# Patient Record
Sex: MALE | Race: BLACK OR AFRICAN AMERICAN
[De-identification: names, ages, dates, MRNs, and addresses within clinical notes are randomized per-mention and may not be internally consistent; named-entity substitution may affect disease eponyms.]

---

## 2022-07-25 ENCOUNTER — HOSPITAL ENCOUNTER (EMERGENCY)
Dept: HOSPITAL 12 - ER | Age: 37
Discharge: HOME | End: 2022-07-25
Payer: MEDICAID

## 2022-07-25 VITALS — DIASTOLIC BLOOD PRESSURE: 47 MMHG | SYSTOLIC BLOOD PRESSURE: 123 MMHG

## 2022-07-25 VITALS — HEIGHT: 67 IN | BODY MASS INDEX: 23.7 KG/M2 | WEIGHT: 151 LBS

## 2022-07-25 DIAGNOSIS — F19.10: ICD-10-CM

## 2022-07-25 DIAGNOSIS — K22.6: ICD-10-CM
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
## 2022-07-25 DIAGNOSIS — Z59.00: ICD-10-CM

## 2022-07-25 DIAGNOSIS — F10.10: Primary | ICD-10-CM

## 2022-07-25 LAB
ALP SERPL-CCNC: 71 U/L (ref 50–136)
ALT SERPL W/O P-5'-P-CCNC: 28 U/L (ref 16–63)
AST SERPL-CCNC: 23 U/L (ref 15–37)
BILIRUB DIRECT SERPL-MCNC: 0.2 MG/DL (ref 0–0.2)
BILIRUB SERPL-MCNC: 0.7 MG/DL (ref 0.2–1)
BUN SERPL-MCNC: 12 MG/DL (ref 7–18)
CHLORIDE SERPL-SCNC: 103 MMOL/L (ref 98–107)
CO2 SERPL-SCNC: 33 MMOL/L (ref 21–32)
CREAT SERPL-MCNC: 0.9 MG/DL (ref 0.6–1.3)
ETHANOL SERPL-MCNC: < 3 MG/DL (ref 0–0)
GLUCOSE SERPL-MCNC: 70 MG/DL (ref 74–106)
HCT VFR BLD AUTO: 42.9 % (ref 36.7–47.1)
LIPASE SERPL-CCNC: 139 U/L (ref 73–393)
MAGNESIUM SERPL-MCNC: 1.9 MG/DL (ref 1.8–2.4)
MCH RBC QN AUTO: 32.5 UUG (ref 23.8–33.4)
MCV RBC AUTO: 91.6 FL (ref 73–96.2)
PLATELET # BLD AUTO: 325 K/UL (ref 152–348)
POTASSIUM SERPL-SCNC: 3.7 MMOL/L (ref 3.5–5.1)
WS STN SPEC: 7.4 G/DL (ref 6.4–8.2)

## 2022-07-25 PROCEDURE — G0480 DRUG TEST DEF 1-7 CLASSES: HCPCS

## 2022-07-25 PROCEDURE — A4663 DIALYSIS BLOOD PRESSURE CUFF: HCPCS

## 2022-07-25 SDOH — ECONOMIC STABILITY - HOUSING INSECURITY: HOMELESSNESS UNSPECIFIED: Z59.00

## 2022-07-27 ENCOUNTER — HOSPITAL ENCOUNTER (EMERGENCY)
Dept: HOSPITAL 12 - ER | Age: 37
LOS: 1 days | Discharge: HOME | End: 2022-07-28
Payer: COMMERCIAL

## 2022-07-27 VITALS — BODY MASS INDEX: 23.49 KG/M2 | HEIGHT: 68 IN | WEIGHT: 155 LBS

## 2022-07-27 DIAGNOSIS — F10.20: ICD-10-CM

## 2022-07-27 DIAGNOSIS — F15.10: Primary | ICD-10-CM

## 2022-07-27 DIAGNOSIS — Z59.00: ICD-10-CM

## 2022-07-27 PROCEDURE — A4663 DIALYSIS BLOOD PRESSURE CUFF: HCPCS

## 2022-07-27 PROCEDURE — G0480 DRUG TEST DEF 1-7 CLASSES: HCPCS

## 2022-07-27 SDOH — ECONOMIC STABILITY - HOUSING INSECURITY: HOMELESSNESS UNSPECIFIED: Z59.00

## 2022-07-28 VITALS — SYSTOLIC BLOOD PRESSURE: 126 MMHG | DIASTOLIC BLOOD PRESSURE: 93 MMHG

## 2022-07-28 LAB
ALP SERPL-CCNC: 66 U/L (ref 50–136)
ALT SERPL W/O P-5'-P-CCNC: 29 U/L (ref 16–63)
AMPHETAMINES UR QL SCN>1000 NG/ML: POSITIVE
APAP SERPL-MCNC: < 2 UG/ML (ref 10–30)
AST SERPL-CCNC: 30 U/L (ref 15–37)
BILIRUB DIRECT SERPL-MCNC: 0.3 MG/DL (ref 0–0.2)
BILIRUB SERPL-MCNC: 0.7 MG/DL (ref 0.2–1)
BUN SERPL-MCNC: 10 MG/DL (ref 7–18)
CHLORIDE SERPL-SCNC: 102 MMOL/L (ref 98–107)
CO2 SERPL-SCNC: 33 MMOL/L (ref 21–32)
COCAINE UR QL SCN: NEGATIVE
CREAT SERPL-MCNC: 0.8 MG/DL (ref 0.6–1.3)
ETHANOL SERPL-MCNC: < 3 MG/DL (ref 0–0)
GLUCOSE SERPL-MCNC: 93 MG/DL (ref 74–106)
HCT VFR BLD AUTO: 43.9 % (ref 36.7–47.1)
MCH RBC QN AUTO: 33.1 UUG (ref 23.8–33.4)
MCV RBC AUTO: 91.9 FL (ref 73–96.2)
OPIATES UR QL SCN: NEGATIVE
PCP UR QL SCN>25 NG/ML: NEGATIVE
PLATELET # BLD AUTO: 339 K/UL (ref 152–348)
POTASSIUM SERPL-SCNC: 4 MMOL/L (ref 3.5–5.1)
THC UR QL SCN>50 NG/ML: POSITIVE
WS STN SPEC: 7.6 G/DL (ref 6.4–8.2)

## 2022-07-28 NOTE — NUR
Social work consult was requested for a homeless patient in the emergency room for 
resources. Patient is 37-year-old male admitted to the hospital for alcohol abuse. Patient 
is alert and oriented X4. Patient presents with anxious mood and congruent affect. Patient 
presents with poor judgement and insight. Patient states his primary contact is his mother, 
Afsaneh ([phone]) and she is currently living in [place], [place]. Patient stated he has 
been homeless since April and has been living in [place]. DEEP gave the patient resources for 
[facility] [place] [address] [place] [address] ([phone]) and 
[facility] [address] [facility] [address] 
([phone]). DEEP gave resources for [facility] [address] [facility] [address].  Resources were placed in the chart. Homeless waiver was signed and given to 
the patient and a copy was placed in the chart. Patient states that he is currently working 
for [employer]. 

 

Patient states that he has a history of alcohol abuse. The toxicology report is positive for 
amphetamine and cannabinoids. DEEP gave the patient referrals and resources for substance use 
from [facility] [address] [facility] [address] ([phone]), [facility] 
[address] [facility] [address] ([phone]), and [facility] [address] [place] [address] ([phone]). Resources were placed in the chart. Patient 
states he has a history of depression. Patient states he has a history of passive suicidal 
ideation. Patient denies homicidal ideation. DEEP provided resources for [facility] 
Mental [facility] [address] [place] [address] ([phone]) and were placed in the 
cart. DEEP provided emotional support and validation when speaking with the patient. Patient 
states he will discharge to a local shelter. DEEP provided the patient a TAP card for 
discharge

## 2022-08-01 ENCOUNTER — HOSPITAL ENCOUNTER (EMERGENCY)
Dept: HOSPITAL 12 - ER | Age: 37
LOS: 1 days | Discharge: HOME | End: 2022-08-02
Payer: MEDICAID

## 2022-08-01 VITALS — BODY MASS INDEX: 29.16 KG/M2 | WEIGHT: 175 LBS | HEIGHT: 65 IN

## 2022-08-01 DIAGNOSIS — F10.10: ICD-10-CM

## 2022-08-01 DIAGNOSIS — Z20.822: ICD-10-CM
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
## 2022-08-01 DIAGNOSIS — F12.10: ICD-10-CM

## 2022-08-01 DIAGNOSIS — R45.850: Primary | ICD-10-CM

## 2022-08-01 DIAGNOSIS — Z59.00: ICD-10-CM

## 2022-08-01 PROCEDURE — G0480 DRUG TEST DEF 1-7 CLASSES: HCPCS

## 2022-08-01 PROCEDURE — A4663 DIALYSIS BLOOD PRESSURE CUFF: HCPCS

## 2022-08-01 SDOH — ECONOMIC STABILITY - HOUSING INSECURITY: HOMELESSNESS UNSPECIFIED: Z59.00

## 2022-08-02 LAB
ALP SERPL-CCNC: 68 U/L (ref 50–136)
ALT SERPL W/O P-5'-P-CCNC: 36 U/L (ref 16–63)
AMPHETAMINES UR QL SCN>1000 NG/ML: NEGATIVE
APAP SERPL-MCNC: < 2 UG/ML (ref 10–30)
AST SERPL-CCNC: 64 U/L (ref 15–37)
BILIRUB DIRECT SERPL-MCNC: 0.4 MG/DL (ref 0–0.2)
BILIRUB SERPL-MCNC: 1 MG/DL (ref 0.2–1)
BUN SERPL-MCNC: 11 MG/DL (ref 7–18)
CHLORIDE SERPL-SCNC: 103 MMOL/L (ref 98–107)
CO2 SERPL-SCNC: 30 MMOL/L (ref 21–32)
COCAINE UR QL SCN: NEGATIVE
CREAT SERPL-MCNC: 0.8 MG/DL (ref 0.6–1.3)
ETHANOL SERPL-MCNC: < 3 MG/DL (ref 0–0)
GLUCOSE SERPL-MCNC: 92 MG/DL (ref 74–106)
HCT VFR BLD AUTO: 41.2 % (ref 36.7–47.1)
MCH RBC QN AUTO: 32.5 UUG (ref 23.8–33.4)
MCV RBC AUTO: 91.2 FL (ref 73–96.2)
OPIATES UR QL SCN: NEGATIVE
PCP UR QL SCN>25 NG/ML: NEGATIVE
PLATELET # BLD AUTO: 300 K/UL (ref 152–348)
POTASSIUM SERPL-SCNC: 3.9 MMOL/L (ref 3.5–5.1)
THC UR QL SCN>50 NG/ML: POSITIVE
WS STN SPEC: 7.1 G/DL (ref 6.4–8.2)

## 2022-08-02 NOTE — NUR
Social work consult was requested for a homeless patient in the emergency room for 
resources. Patient is 37-year-old black male. Patient is alert and oriented X4. Patient 
presents with euthymic mood and full range of affect. Patient presents with poor judgement 
and insight. Patient states his primary contact is his mother, Afsaneh ([phone]) and she 
is currently living in [place]. Patient stated he has been homeless since April and 
has been living in [place]. DEEP provided the patient resources and gave him the information 
for [facility] [place] [address] [place] [address] ([phone]) 
and [facility] [address] [facility] [address] 
([phone]). DEEP gave resources for [employer] [facility] [address] [facility] [address].  Resources were placed in the chart. Homeless waiver was signed and given to 
the patient and a copy was placed in the chart. Patient states that he is currently working 
for [employer]. DEEP provided the patient employment resources for [employer] [facility] [address] [facility], [address], [employer] 
[address] [facility], [address] and [facility] [address] [facility] 
[address], [place], [address]. 



Patient states that he has a history of alcohol abuse. DEEP provided the patient resources and 
referrals for substance use from [facility] [address] [facility] [address] 
([phone]), [facility] [address] [facility] [address] ([phone]), and 
[facility] [address] [facility] [address] ([phone]). The toxicology screen is 
positive for cannabinoids. Resources were placed in the chart. Patient states he has a 
history of depression. Patient denies current suicidal or homicidal ideation. DEEP provided 
resources for [facility] [address] [place] [address] 
([phone]) and were placed in the chart. SW provided emotional support and validation 
when speaking with the patient. Patient states he was going to call [address] [address] ([phone]). Patient states he will discharge to a 
local shelter or treatment center. SW provided the patient a TAP card for discharge.

## 2022-08-09 ENCOUNTER — HOSPITAL ENCOUNTER (EMERGENCY)
Dept: HOSPITAL 12 - ER | Age: 37
LOS: 1 days | Discharge: PSYCHIATRIC OR SUBSTANCE USE FACILITY | End: 2022-08-10
Payer: COMMERCIAL

## 2022-08-09 VITALS — BODY MASS INDEX: 24.33 KG/M2 | WEIGHT: 155 LBS | HEIGHT: 67 IN

## 2022-08-09 DIAGNOSIS — F20.9: ICD-10-CM

## 2022-08-09 DIAGNOSIS — Z20.822: ICD-10-CM

## 2022-08-09 DIAGNOSIS — R45.851: Primary | ICD-10-CM

## 2022-08-09 DIAGNOSIS — F19.10: ICD-10-CM
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
## 2022-08-09 DIAGNOSIS — F10.10: ICD-10-CM

## 2022-08-09 LAB
ALP SERPL-CCNC: 66 U/L (ref 50–136)
ALT SERPL W/O P-5'-P-CCNC: 36 U/L (ref 16–63)
AMPHETAMINES UR QL SCN>1000 NG/ML: NEGATIVE
APAP SERPL-MCNC: < 2 UG/ML (ref 10–30)
APPEARANCE UR: CLEAR
AST SERPL-CCNC: 37 U/L (ref 15–37)
BILIRUB DIRECT SERPL-MCNC: 0.2 MG/DL (ref 0–0.2)
BILIRUB SERPL-MCNC: 0.5 MG/DL (ref 0.2–1)
BILIRUB UR QL STRIP: NEGATIVE
BUN SERPL-MCNC: 13 MG/DL (ref 7–18)
CHLORIDE SERPL-SCNC: 96 MMOL/L (ref 98–107)
CO2 SERPL-SCNC: 29 MMOL/L (ref 21–32)
COCAINE UR QL SCN: NEGATIVE
COLOR UR: YELLOW
CREAT SERPL-MCNC: 0.9 MG/DL (ref 0.6–1.3)
ETHANOL SERPL-MCNC: 4 MG/DL (ref 0–0)
GLUCOSE SERPL-MCNC: 112 MG/DL (ref 74–106)
GLUCOSE UR STRIP-MCNC: NEGATIVE MG/DL
HCT VFR BLD AUTO: 42.1 % (ref 36.7–47.1)
HGB UR QL STRIP: NEGATIVE
KETONES UR STRIP-MCNC: NEGATIVE MG/DL
LEUKOCYTE ESTERASE UR QL STRIP: NEGATIVE
MCH RBC QN AUTO: 33.2 UUG (ref 23.8–33.4)
MCV RBC AUTO: 92.4 FL (ref 73–96.2)
NITRITE UR QL STRIP: NEGATIVE
OPIATES UR QL SCN: NEGATIVE
PCP UR QL SCN>25 NG/ML: NEGATIVE
PH UR STRIP: 6.5 [PH] (ref 5–8)
PLATELET # BLD AUTO: 269 K/UL (ref 152–348)
POTASSIUM SERPL-SCNC: 3.9 MMOL/L (ref 3.5–5.1)
SP GR UR STRIP: 1.02 (ref 1–1.03)
THC UR QL SCN>50 NG/ML: POSITIVE
UROBILINOGEN UR STRIP-MCNC: 0.2 E.U./DL
WS STN SPEC: 7.8 G/DL (ref 6.4–8.2)

## 2022-08-09 PROCEDURE — G0480 DRUG TEST DEF 1-7 CLASSES: HCPCS

## 2022-08-09 PROCEDURE — A4663 DIALYSIS BLOOD PRESSURE CUFF: HCPCS

## 2022-08-09 NOTE — NUR
Faxed facesheet and clinicals to [facility] for request for voluntary 
admission. Faxe #[phone]. Phone # [phone].

## 2022-08-09 NOTE — NUR
Pt changed into patient gown.  Belongings gathered and labeled.



Pt denies auditory and visual hallucinations, endorses possible SI (no plan) 
and HI (nothing specific).

## 2022-08-10 NOTE — NUR
Called report to JENIFFER Frias.



Transport ETA:  [phone] via [facility].  Altagracia adrian.

## 2022-08-10 NOTE — NUR
Kim from [facility] intake called back with transfer info. Patient will be going 
to [facility]. Accepting MD is Dr Moreau, call for report number is 
[phone]. Kim states report will be accepted at 0900 today and can send 
patient at noon today to [facility].

## 2022-08-10 NOTE — NUR
Gave EMTs pt chart and report.  Gave crew pt belongings.  Pt transported to 
[facility].

## 2022-09-29 ENCOUNTER — HOSPITAL ENCOUNTER (EMERGENCY)
Dept: HOSPITAL 12 - ER | Age: 37
LOS: 1 days | Discharge: HOME | End: 2022-09-30
Payer: MEDICAID

## 2022-09-29 VITALS — WEIGHT: 150 LBS | BODY MASS INDEX: 23.54 KG/M2 | HEIGHT: 67 IN

## 2022-09-29 DIAGNOSIS — F19.10: ICD-10-CM

## 2022-09-29 DIAGNOSIS — F32.A: ICD-10-CM

## 2022-09-29 DIAGNOSIS — F41.9: ICD-10-CM

## 2022-09-29 DIAGNOSIS — F10.10: Primary | ICD-10-CM

## 2022-09-29 DIAGNOSIS — F20.9: ICD-10-CM

## 2022-09-29 PROCEDURE — A4663 DIALYSIS BLOOD PRESSURE CUFF: HCPCS

## 2022-09-30 ENCOUNTER — HOSPITAL ENCOUNTER (EMERGENCY)
Dept: HOSPITAL 12 - ER | Age: 37
LOS: 1 days | Discharge: HOME | End: 2022-10-01
Payer: MEDICAID

## 2022-09-30 VITALS — DIASTOLIC BLOOD PRESSURE: 72 MMHG | SYSTOLIC BLOOD PRESSURE: 119 MMHG

## 2022-09-30 VITALS — WEIGHT: 150 LBS | HEIGHT: 67 IN | BODY MASS INDEX: 23.54 KG/M2

## 2022-09-30 DIAGNOSIS — F12.10: ICD-10-CM

## 2022-09-30 DIAGNOSIS — Z00.00: Primary | ICD-10-CM

## 2022-09-30 DIAGNOSIS — Z59.00: ICD-10-CM

## 2022-09-30 DIAGNOSIS — F10.10: ICD-10-CM

## 2022-09-30 DIAGNOSIS — F20.9: ICD-10-CM

## 2022-09-30 PROCEDURE — A4663 DIALYSIS BLOOD PRESSURE CUFF: HCPCS

## 2022-09-30 SDOH — ECONOMIC STABILITY - HOUSING INSECURITY: HOMELESSNESS UNSPECIFIED: Z59.00

## 2022-09-30 NOTE — NUR
PATIENT STATES HE "WANTS HELP FOR MY ALCOHOLISM AND LIFE SITUATION"  DENIES 
SUICIDAL IDEATION OR THOUGHTS.  STATES HE IS "READY" FOR REHAB.

## 2022-09-30 NOTE — NUR
Social Work consult was requested for a patient in the emergency room for mental health, 
homeless and substance abuse resources. Patient is a 37-year-old black male. Patient 
presents with euthymic mood and a full range of affect. Patient presents with poor insight 
and judgement. Patient states he does not have a primary contact. Patient states he is 
unemployed. Patient states he is currently homeless, and DEEP provided the patient with 
homeless resources for [facility] [place] [address] [place] 
[address] ([phone]) and [facility] [address] [facility] [address] ([phone]). DEEP gave resources for [address] [address]. Homeless waiver was signed and was placed in the chart. 



Patient states he has a history of alcohol abuse. There is no toxicology report. DEEP provided 
the patient with substance abuse resources for [address] [address] ([phone]), [facility] [address] [facility] [address] 
([phone]), and [address] [address] ([phone]). 
Patient appeared motivated for treatment and stated he was open to going to [facility]. DEEP provided the patient with a TAP card and directions to [facility].  Patient states he has a history of depression and anxiety. Patient denies 
current suicidal or homicidal ideation. Patient states his plan for discharge is to go to 
[address] [address] ([phone]).

## 2022-09-30 NOTE — NUR
PATIENT WAS CALLED TO BE TRIAGED BUT WAS NOT PRESENT IN THE WAITING ROOM OR 
OUTSIDE OF ER. PATIENT WAS NOT TRIAGED OR SEEN BY ERMD.

## 2022-09-30 NOTE — NUR
patient ate breakfast.  he ambulates with steady gait.  He has his TAP card and 
directions to [facility] which is where he is going now .

## 2022-09-30 NOTE — NUR
-------------------------------------------------------------------------------

          *** Note undone in [facility] - 09/30/22 at 0253 by [email or web address] ***           

-------------------------------------------------------------------------------

PATIENT WAS JUST CALLED TO BE TRIAGED AT THIS TIME DUE TO NO BEDS AVAILABLE IN 
THE ER AND HIGH ACUITY IN THE ER, BUT PATIENT WAS NOT PRESENT IN THE WAITING 
ROOM OR OUTSIDE OF ER.

## 2022-10-01 VITALS — SYSTOLIC BLOOD PRESSURE: 130 MMHG | DIASTOLIC BLOOD PRESSURE: 74 MMHG

## 2022-10-20 ENCOUNTER — HOSPITAL ENCOUNTER (EMERGENCY)
Dept: HOSPITAL 12 - ER | Age: 37
LOS: 1 days | Discharge: HOME | End: 2022-10-21
Payer: MEDICAID

## 2022-10-20 VITALS — WEIGHT: 175 LBS | HEIGHT: 67 IN | BODY MASS INDEX: 27.47 KG/M2

## 2022-10-20 DIAGNOSIS — F19.10: ICD-10-CM

## 2022-10-20 DIAGNOSIS — F20.9: ICD-10-CM

## 2022-10-20 DIAGNOSIS — Z59.00: ICD-10-CM

## 2022-10-20 DIAGNOSIS — G63: ICD-10-CM

## 2022-10-20 DIAGNOSIS — E87.6: ICD-10-CM

## 2022-10-20 DIAGNOSIS — E53.8: Primary | ICD-10-CM

## 2022-10-20 DIAGNOSIS — F10.20: ICD-10-CM

## 2022-10-20 LAB
ALP SERPL-CCNC: 68 U/L (ref 50–136)
ALT SERPL W/O P-5'-P-CCNC: 35 U/L (ref 16–63)
AST SERPL-CCNC: 40 U/L (ref 15–37)
BILIRUB DIRECT SERPL-MCNC: 0.3 MG/DL (ref 0–0.2)
BILIRUB SERPL-MCNC: 0.6 MG/DL (ref 0.2–1)
BUN SERPL-MCNC: 15 MG/DL (ref 7–18)
CHLORIDE SERPL-SCNC: 103 MMOL/L (ref 98–107)
CO2 SERPL-SCNC: 28 MMOL/L (ref 21–32)
CREAT SERPL-MCNC: 1 MG/DL (ref 0.6–1.3)
GLUCOSE SERPL-MCNC: 133 MG/DL (ref 74–106)
HCT VFR BLD AUTO: 38.8 % (ref 36.7–47.1)
MCH RBC QN AUTO: 33.9 UUG (ref 23.8–33.4)
MCV RBC AUTO: 94.4 FL (ref 73–96.2)
PLATELET # BLD AUTO: 343 K/UL (ref 152–348)
POTASSIUM SERPL-SCNC: 3.4 MMOL/L (ref 3.5–5.1)
WS STN SPEC: 6.9 G/DL (ref 6.4–8.2)

## 2022-10-20 PROCEDURE — 85025 COMPLETE CBC W/AUTO DIFF WBC: CPT

## 2022-10-20 PROCEDURE — 36415 COLL VENOUS BLD VENIPUNCTURE: CPT

## 2022-10-20 PROCEDURE — 80076 HEPATIC FUNCTION PANEL: CPT

## 2022-10-20 PROCEDURE — 99283 EMERGENCY DEPT VISIT LOW MDM: CPT

## 2022-10-20 PROCEDURE — 96372 THER/PROPH/DIAG INJ SC/IM: CPT

## 2022-10-20 PROCEDURE — 80048 BASIC METABOLIC PNL TOTAL CA: CPT

## 2022-10-20 PROCEDURE — 82607 VITAMIN B-12: CPT

## 2022-10-20 PROCEDURE — A4663 DIALYSIS BLOOD PRESSURE CUFF: HCPCS

## 2022-10-20 SDOH — ECONOMIC STABILITY - HOUSING INSECURITY: HOMELESSNESS UNSPECIFIED: Z59.00

## 2022-10-21 VITALS — DIASTOLIC BLOOD PRESSURE: 67 MMHG | SYSTOLIC BLOOD PRESSURE: 115 MMHG

## 2022-10-21 NOTE — NUR
Patient given written and verbal discharge instructions.  Patient verbalizes 
understanding of instructions.  Patient is ambulatory with steady gait.  
Refuses offer of shelter placement.  Patient given list of available shelters 
in surrounding area. Patient is a/ox4, NAD noted. Patient is able to walk with 
steady gait

## 2022-12-21 ENCOUNTER — HOSPITAL ENCOUNTER (EMERGENCY)
Dept: HOSPITAL 12 - ER | Age: 37
Discharge: HOME | End: 2022-12-21
Payer: MEDICAID

## 2022-12-21 VITALS — BODY MASS INDEX: 27.47 KG/M2 | WEIGHT: 175 LBS | HEIGHT: 67 IN
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
## 2022-12-21 DIAGNOSIS — F20.9: ICD-10-CM

## 2022-12-21 DIAGNOSIS — F32.A: ICD-10-CM

## 2022-12-21 DIAGNOSIS — S89.91XA: Primary | ICD-10-CM

## 2022-12-21 DIAGNOSIS — Z59.00: ICD-10-CM

## 2022-12-21 DIAGNOSIS — F41.9: ICD-10-CM
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
## 2022-12-21 PROCEDURE — 99283 EMERGENCY DEPT VISIT LOW MDM: CPT

## 2022-12-21 PROCEDURE — A4663 DIALYSIS BLOOD PRESSURE CUFF: HCPCS

## 2022-12-21 PROCEDURE — 96372 THER/PROPH/DIAG INJ SC/IM: CPT

## 2022-12-21 PROCEDURE — 73564 X-RAY EXAM KNEE 4 OR MORE: CPT

## 2022-12-21 SDOH — ECONOMIC STABILITY - HOUSING INSECURITY: HOMELESSNESS UNSPECIFIED: Z59.00

## 2022-12-21 NOTE — NUR
Social Work consult was requested for a patient in the emergency room for homeless 
resources. Patient is a 37-year-old black male. Patient presents with anxious mood and 
congruent affect. Patient presents with poor insight and judgement. Patient states he does 
not have a primary contact. Patient states he is unemployed but was working for a moving 
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 
Patient states he is currently homeless, and DEEP provided the patient with homeless resources 
for [facility] [place] [address] [place] [address] ([phone]) 
and [facility] [address] [facility] [address] 
([phone]). DEEP gave resources for [facility] [address] [place] [address]. Homeless waiver was signed and was placed in the chart. 



Patient states he has a history of alcohol abuse. There is no toxicology report. DEEP provided 
the patient with substance abuse resources for [facility] [address] [facility] [address] ([phone]), [facility] [address] [facility] [address] 
([phone]), and [facility] [address] [place] [address] ([phone]). 
Patient appeared unmotivated for treatment. Patient states he has a history of depression 
and anxiety, and DEEP provided the patient with the mental health resource for [facility] 
Mental [facility] [address] [place] [address] ([phone]). Patient 
denies current suicidal or homicidal ideation. DEEP provided the patient with a TAP card and 
patient states he will follow up with making his own arrangements for a living arrangement 
using the resources provided.

## 2022-12-21 NOTE — NUR
Patient discharged to home in stable condition.  Written and verbal after care 
instructions given. 

Patient verbalizes understanding of instructions. Stressed follow up or return 
to ER for worsening s/s.

Pt was seen by Nini () prior to d/c and given referrals and a 
TAP card for the bus.

Pt ambulated out of ER with steady gait.

## 2022-12-22 ENCOUNTER — HOSPITAL ENCOUNTER (EMERGENCY)
Dept: HOSPITAL 12 - ER | Age: 37
LOS: 1 days | Discharge: HOME | End: 2022-12-23
Payer: MEDICAID

## 2022-12-22 VITALS — BODY MASS INDEX: 25.11 KG/M2 | WEIGHT: 160 LBS | HEIGHT: 67 IN

## 2022-12-22 DIAGNOSIS — F20.9: ICD-10-CM

## 2022-12-22 DIAGNOSIS — Z20.822: ICD-10-CM

## 2022-12-22 DIAGNOSIS — F32.A: Primary | ICD-10-CM
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
## 2022-12-22 DIAGNOSIS — F19.10: ICD-10-CM

## 2022-12-22 DIAGNOSIS — F10.10: ICD-10-CM

## 2022-12-22 DIAGNOSIS — Z59.00: ICD-10-CM

## 2022-12-22 PROCEDURE — A4663 DIALYSIS BLOOD PRESSURE CUFF: HCPCS

## 2022-12-22 PROCEDURE — G0480 DRUG TEST DEF 1-7 CLASSES: HCPCS

## 2022-12-22 SDOH — ECONOMIC STABILITY - HOUSING INSECURITY: HOMELESSNESS UNSPECIFIED: Z59.00

## 2022-12-23 LAB
ALP SERPL-CCNC: 88 U/L (ref 50–136)
ALT SERPL W/O P-5'-P-CCNC: 27 U/L (ref 16–63)
AMPHETAMINES UR QL SCN>1000 NG/ML: NEGATIVE
APAP SERPL-MCNC: < 10 UG/ML (ref 10–30)
APPEARANCE UR: CLEAR
AST SERPL-CCNC: 21 U/L (ref 15–37)
BILIRUB DIRECT SERPL-MCNC: 0.2 MG/DL (ref 0–0.2)
BILIRUB SERPL-MCNC: 0.3 MG/DL (ref 0.2–1)
BILIRUB UR QL STRIP: NEGATIVE
BUN SERPL-MCNC: 8 MG/DL (ref 7–18)
CHLORIDE SERPL-SCNC: 104 MMOL/L (ref 98–107)
CO2 SERPL-SCNC: 29 MMOL/L (ref 21–32)
COCAINE UR QL SCN: NEGATIVE
COLOR UR: (no result)
CREAT SERPL-MCNC: 0.8 MG/DL (ref 0.6–1.3)
ETHANOL SERPL-MCNC: 150 MG/DL (ref 0–0)
GLUCOSE SERPL-MCNC: 162 MG/DL (ref 74–106)
GLUCOSE UR STRIP-MCNC: NEGATIVE MG/DL
HCT VFR BLD AUTO: 37.4 % (ref 36.7–47.1)
HGB UR QL STRIP: NEGATIVE
KETONES UR STRIP-MCNC: NEGATIVE MG/DL
LEUKOCYTE ESTERASE UR QL STRIP: NEGATIVE
MCH RBC QN AUTO: 33.8 UUG (ref 23.8–33.4)
MCV RBC AUTO: 94.3 FL (ref 73–96.2)
NITRITE UR QL STRIP: NEGATIVE
OPIATES UR QL SCN: NEGATIVE
PCP UR QL SCN>25 NG/ML: NEGATIVE
PH UR STRIP: 5.5 [PH] (ref 5–8)
PLATELET # BLD AUTO: 342 K/UL (ref 152–348)
POTASSIUM SERPL-SCNC: 3.7 MMOL/L (ref 3.5–5.1)
SP GR UR STRIP: 1.01 (ref 1–1.03)
THC UR QL SCN>50 NG/ML: POSITIVE
UROBILINOGEN UR STRIP-MCNC: 0.2 E.U./DL
WS STN SPEC: 7 G/DL (ref 6.4–8.2)

## 2022-12-24 ENCOUNTER — HOSPITAL ENCOUNTER (EMERGENCY)
Dept: HOSPITAL 12 - ER | Age: 37
LOS: 1 days | Discharge: HOME | End: 2022-12-25
Payer: MEDICAID

## 2022-12-24 VITALS — HEIGHT: 67 IN | WEIGHT: 160 LBS | BODY MASS INDEX: 25.11 KG/M2

## 2022-12-24 DIAGNOSIS — F20.9: ICD-10-CM

## 2022-12-24 DIAGNOSIS — F19.10: ICD-10-CM
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
## 2022-12-24 DIAGNOSIS — Z59.02: ICD-10-CM

## 2022-12-24 DIAGNOSIS — E53.8: ICD-10-CM

## 2022-12-24 DIAGNOSIS — F10.20: Primary | ICD-10-CM

## 2022-12-24 LAB
BUN SERPL-MCNC: 14 MG/DL (ref 7–18)
CHLORIDE SERPL-SCNC: 100 MMOL/L (ref 98–107)
CO2 SERPL-SCNC: 30 MMOL/L (ref 21–32)
CREAT SERPL-MCNC: 0.8 MG/DL (ref 0.6–1.3)
ETHANOL SERPL-MCNC: 210 MG/DL (ref 0–0)
GLUCOSE SERPL-MCNC: 111 MG/DL (ref 74–106)
HCT VFR BLD AUTO: 41 % (ref 36.7–47.1)
MAGNESIUM SERPL-MCNC: 2.1 MG/DL (ref 1.8–2.4)
MCH RBC QN AUTO: 32.5 UUG (ref 23.8–33.4)
MCV RBC AUTO: 94.5 FL (ref 73–96.2)
PLATELET # BLD AUTO: 368 K/UL (ref 152–348)
POTASSIUM SERPL-SCNC: 3.8 MMOL/L (ref 3.5–5.1)

## 2022-12-24 PROCEDURE — 36415 COLL VENOUS BLD VENIPUNCTURE: CPT

## 2022-12-24 PROCEDURE — 83735 ASSAY OF MAGNESIUM: CPT

## 2022-12-24 PROCEDURE — 96372 THER/PROPH/DIAG INJ SC/IM: CPT

## 2022-12-24 PROCEDURE — 80320 DRUG SCREEN QUANTALCOHOLS: CPT

## 2022-12-24 PROCEDURE — 85025 COMPLETE CBC W/AUTO DIFF WBC: CPT

## 2022-12-24 PROCEDURE — G0480 DRUG TEST DEF 1-7 CLASSES: HCPCS

## 2022-12-24 PROCEDURE — 99283 EMERGENCY DEPT VISIT LOW MDM: CPT

## 2022-12-24 PROCEDURE — 80048 BASIC METABOLIC PNL TOTAL CA: CPT

## 2022-12-24 PROCEDURE — A4663 DIALYSIS BLOOD PRESSURE CUFF: HCPCS

## 2022-12-24 PROCEDURE — 82607 VITAMIN B-12: CPT

## 2022-12-24 SDOH — ECONOMIC STABILITY - HOUSING INSECURITY: UNSHELTERED HOMELESSNESS: Z59.02

## 2022-12-25 VITALS — SYSTOLIC BLOOD PRESSURE: 130 MMHG | DIASTOLIC BLOOD PRESSURE: 75 MMHG

## 2022-12-25 NOTE — NUR
Patient given written and verbal discharge instructions.  Patient verbalizes 
understanding of instructions.  Patient is ambulatory with steady gait.  
Refuses offer of shelter placement.  Patient given list of available shelters 
in surrounding area. Pt out of ER with steady gait, no acute signs of distress, 
VSS, all belongings taken, provided with list of substance abuse resources and 
shelters, refuses all other services at this time.

## 2022-12-30 ENCOUNTER — HOSPITAL ENCOUNTER (EMERGENCY)
Age: 37
Discharge: HOME | End: 2022-12-30

## 2022-12-30 DIAGNOSIS — F32.A: ICD-10-CM

## 2022-12-30 DIAGNOSIS — F41.9: ICD-10-CM

## 2022-12-30 DIAGNOSIS — G63: ICD-10-CM

## 2022-12-30 DIAGNOSIS — R00.0: ICD-10-CM

## 2022-12-30 DIAGNOSIS — F10.20: ICD-10-CM

## 2022-12-30 DIAGNOSIS — E53.8: ICD-10-CM

## 2022-12-30 DIAGNOSIS — Z90.49: ICD-10-CM

## 2022-12-30 DIAGNOSIS — F20.9: ICD-10-CM

## 2022-12-30 DIAGNOSIS — F19.10: ICD-10-CM
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
## 2022-12-30 DIAGNOSIS — R11.2: Primary | ICD-10-CM

## 2022-12-30 DIAGNOSIS — Z59.00: ICD-10-CM

## 2022-12-30 PROCEDURE — 85025 COMPLETE CBC W/AUTO DIFF WBC: CPT

## 2022-12-30 PROCEDURE — 80320 DRUG SCREEN QUANTALCOHOLS: CPT

## 2022-12-30 PROCEDURE — 96374 THER/PROPH/DIAG INJ IV PUSH: CPT

## 2022-12-30 PROCEDURE — 96372 THER/PROPH/DIAG INJ SC/IM: CPT

## 2022-12-30 PROCEDURE — 99284 EMERGENCY DEPT VISIT MOD MDM: CPT

## 2022-12-30 PROCEDURE — 83690 ASSAY OF LIPASE: CPT

## 2022-12-30 PROCEDURE — 36415 COLL VENOUS BLD VENIPUNCTURE: CPT

## 2022-12-30 PROCEDURE — 96361 HYDRATE IV INFUSION ADD-ON: CPT

## 2022-12-30 PROCEDURE — 80048 BASIC METABOLIC PNL TOTAL CA: CPT

## 2022-12-30 SDOH — ECONOMIC STABILITY - HOUSING INSECURITY: HOMELESSNESS UNSPECIFIED: Z59.00

## 2022-12-31 ENCOUNTER — HOSPITAL ENCOUNTER (EMERGENCY)
Age: 37
Discharge: HOME | End: 2022-12-31

## 2022-12-31 DIAGNOSIS — Z59.00: ICD-10-CM

## 2022-12-31 DIAGNOSIS — B35.3: Primary | ICD-10-CM

## 2022-12-31 DIAGNOSIS — F32.A: ICD-10-CM

## 2022-12-31 DIAGNOSIS — Z79.899: ICD-10-CM

## 2022-12-31 DIAGNOSIS — F10.10: ICD-10-CM

## 2022-12-31 SDOH — ECONOMIC STABILITY - HOUSING INSECURITY: HOMELESSNESS UNSPECIFIED: Z59.00

## 2023-01-07 ENCOUNTER — HOSPITAL ENCOUNTER (EMERGENCY)
Dept: HOSPITAL 12 - ER | Age: 38
LOS: 1 days | Discharge: PSYCHIATRIC OR SUBSTANCE USE FACILITY | End: 2023-01-08
Payer: MEDICAID

## 2023-01-07 VITALS — WEIGHT: 160 LBS | BODY MASS INDEX: 25.11 KG/M2 | HEIGHT: 67 IN

## 2023-01-07 DIAGNOSIS — F10.229: Primary | ICD-10-CM

## 2023-01-07 DIAGNOSIS — Z20.822: ICD-10-CM

## 2023-01-07 DIAGNOSIS — F19.10: ICD-10-CM

## 2023-01-07 DIAGNOSIS — Z59.00: ICD-10-CM
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
## 2023-01-07 DIAGNOSIS — Z86.59: ICD-10-CM

## 2023-01-07 LAB
HCT VFR BLD AUTO: 39 % (ref 36.7–47.1)
MCH RBC QN AUTO: 32.7 UUG (ref 23.8–33.4)
MCV RBC AUTO: 94 FL (ref 73–96.2)
PLATELET # BLD AUTO: 299 K/UL (ref 152–348)

## 2023-01-07 PROCEDURE — A4663 DIALYSIS BLOOD PRESSURE CUFF: HCPCS

## 2023-01-07 PROCEDURE — 99285 EMERGENCY DEPT VISIT HI MDM: CPT

## 2023-01-07 PROCEDURE — 36415 COLL VENOUS BLD VENIPUNCTURE: CPT

## 2023-01-07 PROCEDURE — 80320 DRUG SCREEN QUANTALCOHOLS: CPT

## 2023-01-07 PROCEDURE — 80307 DRUG TEST PRSMV CHEM ANLYZR: CPT

## 2023-01-07 PROCEDURE — 85025 COMPLETE CBC W/AUTO DIFF WBC: CPT

## 2023-01-07 PROCEDURE — 80076 HEPATIC FUNCTION PANEL: CPT

## 2023-01-07 PROCEDURE — 96372 THER/PROPH/DIAG INJ SC/IM: CPT

## 2023-01-07 PROCEDURE — 81001 URINALYSIS AUTO W/SCOPE: CPT

## 2023-01-07 PROCEDURE — 80048 BASIC METABOLIC PNL TOTAL CA: CPT

## 2023-01-07 PROCEDURE — G0480 DRUG TEST DEF 1-7 CLASSES: HCPCS

## 2023-01-07 PROCEDURE — 87426 SARSCOV CORONAVIRUS AG IA: CPT

## 2023-01-07 PROCEDURE — 80299 QUANTITATIVE ASSAY DRUG: CPT

## 2023-01-07 SDOH — ECONOMIC STABILITY - HOUSING INSECURITY: HOMELESSNESS UNSPECIFIED: Z59.00

## 2023-01-07 NOTE — NUR
Patient resting in bed eating, no s/s of any distress noted. informed of plan 
of care, awaiting MD exam. will continue to monitor.

## 2023-01-08 LAB
ALP SERPL-CCNC: 106 U/L (ref 50–136)
ALT SERPL W/O P-5'-P-CCNC: 38 U/L (ref 16–63)
AMPHETAMINES UR QL SCN>1000 NG/ML: POSITIVE
APAP SERPL-MCNC: < 10 UG/ML (ref 10–30)
APPEARANCE UR: CLEAR
AST SERPL-CCNC: 32 U/L (ref 15–37)
BILIRUB DIRECT SERPL-MCNC: 0.3 MG/DL (ref 0–0.2)
BILIRUB SERPL-MCNC: 0.4 MG/DL (ref 0.2–1)
BILIRUB UR QL STRIP: NEGATIVE
BUN SERPL-MCNC: 7 MG/DL (ref 7–18)
CHLORIDE SERPL-SCNC: 102 MMOL/L (ref 98–107)
CO2 SERPL-SCNC: 27 MMOL/L (ref 21–32)
COCAINE UR QL SCN: NEGATIVE
COLOR UR: YELLOW
CREAT SERPL-MCNC: 0.7 MG/DL (ref 0.6–1.3)
DEPRECATED SQUAMOUS URNS QL MICRO: (no result) /HPF
ETHANOL SERPL-MCNC: 116 MG/DL (ref 0–0)
ETHANOL SERPL-MCNC: < 3 MG/DL (ref 0–0)
GLUCOSE SERPL-MCNC: 117 MG/DL (ref 74–106)
GLUCOSE UR STRIP-MCNC: NEGATIVE MG/DL
HGB UR QL STRIP: NEGATIVE
KETONES UR STRIP-MCNC: (no result) MG/DL
LEUKOCYTE ESTERASE UR QL STRIP: NEGATIVE
NITRITE UR QL STRIP: NEGATIVE
OPIATES UR QL SCN: NEGATIVE
PCP UR QL SCN>25 NG/ML: NEGATIVE
PH UR STRIP: 5.5 [PH] (ref 5–8)
POTASSIUM SERPL-SCNC: 3.7 MMOL/L (ref 3.5–5.1)
RBC #/AREA URNS HPF: (no result) /HPF (ref 0–3)
SP GR UR STRIP: >=1.03 (ref 1–1.03)
THC UR QL SCN>50 NG/ML: POSITIVE
URATE CRY URNS QL MICRO: (no result) /HPF
UROBILINOGEN UR STRIP-MCNC: 0.2 E.U./DL
WBC #/AREA URNS HPF: (no result) /HPF
WBC #/AREA URNS HPF: (no result) /HPF (ref 0–3)
WS STN SPEC: 7.3 G/DL (ref 6.4–8.2)

## 2023-01-08 NOTE — NUR
patient continues to rest, easy to arouse, no voiced c/o at this time. No 
change noted in primar assessment.

## 2023-01-17 ENCOUNTER — HOSPITAL ENCOUNTER (EMERGENCY)
Dept: HOSPITAL 12 - ER | Age: 38
LOS: 1 days | Discharge: LEFT BEFORE DISCHARGE | End: 2023-01-18
Payer: MEDICAID

## 2023-01-17 DIAGNOSIS — Z53.21: Primary | ICD-10-CM

## 2023-01-18 ENCOUNTER — HOSPITAL ENCOUNTER (EMERGENCY)
Dept: HOSPITAL 12 - ER | Age: 38
Discharge: LEFT BEFORE DISCHARGE | End: 2023-01-18
Payer: MEDICAID

## 2023-01-18 ENCOUNTER — HOSPITAL ENCOUNTER (EMERGENCY)
Dept: HOSPITAL 12 - ER | Age: 38
Discharge: HOME | End: 2023-01-18
Payer: MEDICAID

## 2023-01-18 VITALS — WEIGHT: 160 LBS | HEIGHT: 67 IN | BODY MASS INDEX: 25.11 KG/M2

## 2023-01-18 VITALS — DIASTOLIC BLOOD PRESSURE: 70 MMHG | SYSTOLIC BLOOD PRESSURE: 117 MMHG

## 2023-01-18 DIAGNOSIS — R11.2: Primary | ICD-10-CM
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
## 2023-01-18 DIAGNOSIS — Z59.00: ICD-10-CM

## 2023-01-18 DIAGNOSIS — Z90.49: ICD-10-CM

## 2023-01-18 DIAGNOSIS — F10.10: ICD-10-CM

## 2023-01-18 DIAGNOSIS — Z53.21: Primary | ICD-10-CM

## 2023-01-18 LAB
ALP SERPL-CCNC: 69 U/L (ref 50–136)
ALT SERPL W/O P-5'-P-CCNC: 49 U/L (ref 16–63)
AST SERPL-CCNC: 42 U/L (ref 15–37)
BILIRUB DIRECT SERPL-MCNC: 0.4 MG/DL (ref 0–0.2)
BILIRUB SERPL-MCNC: 0.6 MG/DL (ref 0.2–1)
BUN SERPL-MCNC: 10 MG/DL (ref 7–18)
CHLORIDE SERPL-SCNC: 102 MMOL/L (ref 98–107)
CO2 SERPL-SCNC: 30 MMOL/L (ref 21–32)
CREAT SERPL-MCNC: 0.8 MG/DL (ref 0.6–1.3)
ETHANOL SERPL-MCNC: 42 MG/DL (ref 0–0)
GLUCOSE SERPL-MCNC: 103 MG/DL (ref 74–106)
HCT VFR BLD AUTO: 43.9 % (ref 36.7–47.1)
LIPASE SERPL-CCNC: 80 U/L (ref 73–393)
MCH RBC QN AUTO: 33.3 UUG (ref 23.8–33.4)
MCV RBC AUTO: 93.6 FL (ref 73–96.2)
PLATELET # BLD AUTO: 402 K/UL (ref 152–348)
POTASSIUM SERPL-SCNC: 4 MMOL/L (ref 3.5–5.1)
WS STN SPEC: 8.1 G/DL (ref 6.4–8.2)

## 2023-01-18 PROCEDURE — 80076 HEPATIC FUNCTION PANEL: CPT

## 2023-01-18 PROCEDURE — 96375 TX/PRO/DX INJ NEW DRUG ADDON: CPT

## 2023-01-18 PROCEDURE — 85025 COMPLETE CBC W/AUTO DIFF WBC: CPT

## 2023-01-18 PROCEDURE — 36415 COLL VENOUS BLD VENIPUNCTURE: CPT

## 2023-01-18 PROCEDURE — 80320 DRUG SCREEN QUANTALCOHOLS: CPT

## 2023-01-18 PROCEDURE — G0480 DRUG TEST DEF 1-7 CLASSES: HCPCS

## 2023-01-18 PROCEDURE — 99284 EMERGENCY DEPT VISIT MOD MDM: CPT

## 2023-01-18 PROCEDURE — 80048 BASIC METABOLIC PNL TOTAL CA: CPT

## 2023-01-18 PROCEDURE — 83690 ASSAY OF LIPASE: CPT

## 2023-01-18 PROCEDURE — C9113 INJ PANTOPRAZOLE SODIUM, VIA: HCPCS

## 2023-01-18 PROCEDURE — 96374 THER/PROPH/DIAG INJ IV PUSH: CPT

## 2023-01-18 PROCEDURE — 96361 HYDRATE IV INFUSION ADD-ON: CPT

## 2023-01-18 SDOH — ECONOMIC STABILITY - HOUSING INSECURITY: HOMELESSNESS UNSPECIFIED: Z59.00

## 2023-01-20 ENCOUNTER — HOSPITAL ENCOUNTER (EMERGENCY)
Dept: HOSPITAL 12 - ER | Age: 38
LOS: 1 days | Discharge: LEFT BEFORE DISCHARGE | End: 2023-01-21
Payer: MEDICAID

## 2023-01-20 VITALS — WEIGHT: 160 LBS | HEIGHT: 67 IN | BODY MASS INDEX: 25.11 KG/M2

## 2023-01-20 DIAGNOSIS — Z90.49: ICD-10-CM

## 2023-01-20 DIAGNOSIS — Z59.00: ICD-10-CM

## 2023-01-20 DIAGNOSIS — Z53.29: ICD-10-CM
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
## 2023-01-20 DIAGNOSIS — F41.9: Primary | ICD-10-CM

## 2023-01-20 DIAGNOSIS — R82.5: ICD-10-CM

## 2023-01-20 DIAGNOSIS — G62.9: ICD-10-CM

## 2023-01-20 DIAGNOSIS — F10.20: ICD-10-CM

## 2023-01-20 DIAGNOSIS — Z20.822: ICD-10-CM

## 2023-01-20 DIAGNOSIS — F20.9: ICD-10-CM

## 2023-01-20 PROCEDURE — G0480 DRUG TEST DEF 1-7 CLASSES: HCPCS

## 2023-01-20 PROCEDURE — A4663 DIALYSIS BLOOD PRESSURE CUFF: HCPCS

## 2023-01-20 SDOH — ECONOMIC STABILITY - HOUSING INSECURITY: HOMELESSNESS UNSPECIFIED: Z59.00

## 2023-01-21 ENCOUNTER — HOSPITAL ENCOUNTER (EMERGENCY)
Dept: HOSPITAL 12 - ER | Age: 38
LOS: 1 days | Discharge: HOME | End: 2023-01-22
Payer: MEDICAID

## 2023-01-21 VITALS — WEIGHT: 160 LBS | BODY MASS INDEX: 25.11 KG/M2 | HEIGHT: 67 IN

## 2023-01-21 VITALS — DIASTOLIC BLOOD PRESSURE: 84 MMHG | SYSTOLIC BLOOD PRESSURE: 136 MMHG

## 2023-01-21 DIAGNOSIS — F10.20: Primary | ICD-10-CM

## 2023-01-21 DIAGNOSIS — Z59.00: ICD-10-CM

## 2023-01-21 DIAGNOSIS — F20.9: ICD-10-CM

## 2023-01-21 DIAGNOSIS — Z79.899: ICD-10-CM

## 2023-01-21 DIAGNOSIS — G62.9: ICD-10-CM

## 2023-01-21 DIAGNOSIS — Z90.49: ICD-10-CM

## 2023-01-21 DIAGNOSIS — E53.8: ICD-10-CM

## 2023-01-21 LAB
ALP SERPL-CCNC: 71 U/L (ref 50–136)
ALT SERPL W/O P-5'-P-CCNC: 64 U/L (ref 16–63)
AMPHETAMINES UR QL SCN>1000 NG/ML: POSITIVE
APAP SERPL-MCNC: < 10 UG/ML (ref 10–30)
APPEARANCE UR: CLEAR
AST SERPL-CCNC: 59 U/L (ref 15–37)
BILIRUB DIRECT SERPL-MCNC: 0.3 MG/DL (ref 0–0.2)
BILIRUB SERPL-MCNC: 0.5 MG/DL (ref 0.2–1)
BILIRUB UR QL STRIP: NEGATIVE
BUN SERPL-MCNC: 15 MG/DL (ref 7–18)
CHLORIDE SERPL-SCNC: 101 MMOL/L (ref 98–107)
CO2 SERPL-SCNC: 27 MMOL/L (ref 21–32)
COCAINE UR QL SCN: NEGATIVE
COLOR UR: YELLOW
CREAT SERPL-MCNC: 0.8 MG/DL (ref 0.6–1.3)
ETHANOL SERPL-MCNC: 33 MG/DL (ref 0–0)
GLUCOSE SERPL-MCNC: 71 MG/DL (ref 74–106)
GLUCOSE UR STRIP-MCNC: NEGATIVE MG/DL
HCT VFR BLD AUTO: 42.1 % (ref 36.7–47.1)
HGB UR QL STRIP: NEGATIVE
KETONES UR STRIP-MCNC: NEGATIVE MG/DL
LEUKOCYTE ESTERASE UR QL STRIP: NEGATIVE
MCH RBC QN AUTO: 32.5 UUG (ref 23.8–33.4)
MCV RBC AUTO: 93.4 FL (ref 73–96.2)
NITRITE UR QL STRIP: NEGATIVE
OPIATES UR QL SCN: NEGATIVE
PCP UR QL SCN>25 NG/ML: NEGATIVE
PH UR STRIP: 5.5 [PH] (ref 5–8)
PLATELET # BLD AUTO: 406 K/UL (ref 152–348)
POTASSIUM SERPL-SCNC: 4.1 MMOL/L (ref 3.5–5.1)
SP GR UR STRIP: >=1.03 (ref 1–1.03)
THC UR QL SCN>50 NG/ML: POSITIVE
TSH SERPL DL<=0.005 MIU/L-ACNC: 3.02 MIU/ML (ref 0.36–3.74)
UROBILINOGEN UR STRIP-MCNC: 0.2 E.U./DL
WS STN SPEC: 7.6 G/DL (ref 6.4–8.2)

## 2023-01-21 PROCEDURE — A4663 DIALYSIS BLOOD PRESSURE CUFF: HCPCS

## 2023-01-21 PROCEDURE — 99283 EMERGENCY DEPT VISIT LOW MDM: CPT

## 2023-01-21 PROCEDURE — 96372 THER/PROPH/DIAG INJ SC/IM: CPT

## 2023-01-21 SDOH — ECONOMIC STABILITY - HOUSING INSECURITY: HOMELESSNESS UNSPECIFIED: Z59.00

## 2023-01-21 NOTE — NUR
PT REFUSED TO BE TRANSFERED TO \[facility]\"". PT DECIDED TO LEAVE 
HOSPITAL AMA. DR COLE EXPLAINED ALL RISKS OF LEAVING HOSPITAL AMA TO THE 
PT. PT VERBALISED FULL UNDERSTANDING. PT SIGNED AMA FORM AND LEFT [facility] ER BY TAXI. PT DID NOT SHOW ANY  S/S OF DISTRES .

## 2023-01-21 NOTE — NUR
Aminah beaulieu [facility] called and informed me that the patient is 
accepted for admission.

## 2023-01-22 VITALS — SYSTOLIC BLOOD PRESSURE: 132 MMHG | DIASTOLIC BLOOD PRESSURE: 80 MMHG

## 2024-11-12 NOTE — NUR
Called [facility] intake and was informed by Kim who states they are not able to 
accept patient because they are not contracted with patient's insurance.
Called [facility] [phone] and spoke to intake who requested facesheet 
and clinical to be faxed to [phone].
Medically cleared by Dr Serrano.
Patient discharged in stable condition.  Written and verbal after care 
instructions given. 

Patient verbalizes understanding of instructions. Pt was seen by the social 
worker, given referrals and a TAP card for the bus. Pt ambulated out of ER.
Patient sleeping on gurny with no distress noted.
Received patient sleeping in room 5 with no s/s of distress noted at this time.
Social Work consult was requested for a patient in the emergency room for homeless and 
mental health resources. Patient is a 37-year-old black male. Patient presents with anxious 
mood and congruent affect. Patient appears lethargic. Patient states he does not have a 
primary contact. Patient states he is currently homeless, and DEEP provided the patient with 
homeless resources for [facility] [place] [address] [place] 
[address] ([phone]) and [facility] [address] [facility] [address] ([phone]). DEEP gave resources for [facility] [address] [facility] [address]. Homeless waiver was signed and placed in the chart. 



Patient states he has a history of alcohol abuse. The patients alcohol level was 150 and 
the toxicology report was positive for cannabinoids.  DEEP provided the patient with substance 
abuse resources for [facility] [address] [facility] [address] 
([phone]), [facility] [address] [facility] [address] ([phone]), and 
[facility] [address] [facility] [address] ([phone]). Patient appeared 
unmotivated for treatment. Patient states he has a history of depression and anxiety, and DEEP 
provided the patient with the mental health resource for [facility] [address] [facility] [place] [address] ([phone]). Patient denies current suicidal 
or homicidal ideation. DEEP provided the patient with a TAP card and patient states he will 
follow up with making his own arrangements for a living arrangement using the resources 
provided.
see HPI